# Patient Record
Sex: FEMALE | Race: WHITE | NOT HISPANIC OR LATINO | Employment: UNEMPLOYED | URBAN - METROPOLITAN AREA
[De-identification: names, ages, dates, MRNs, and addresses within clinical notes are randomized per-mention and may not be internally consistent; named-entity substitution may affect disease eponyms.]

---

## 2019-12-09 ENCOUNTER — HISTORICAL (OUTPATIENT)
Dept: ADMINISTRATIVE | Facility: HOSPITAL | Age: 39
End: 2019-12-09

## 2019-12-09 LAB
ALBUMIN SERPL-MCNC: 3.9 GM/DL (ref 3.4–5)
ALBUMIN/GLOB SERPL: 1.2 RATIO (ref 1.1–2)
ALP SERPL-CCNC: 112 UNIT/L (ref 45–117)
ALT SERPL-CCNC: 46 UNIT/L (ref 12–78)
AST SERPL-CCNC: 29 UNIT/L (ref 15–37)
BILIRUB SERPL-MCNC: 0.5 MG/DL (ref 0.2–1)
BILIRUBIN DIRECT+TOT PNL SERPL-MCNC: 0.1 MG/DL (ref 0–0.2)
BILIRUBIN DIRECT+TOT PNL SERPL-MCNC: 0.4 MG/DL
BUN SERPL-MCNC: 12 MG/DL (ref 7–18)
CALCIUM SERPL-MCNC: 9.1 MG/DL (ref 8.5–10.1)
CHLORIDE SERPL-SCNC: 109 MMOL/L (ref 98–107)
CO2 SERPL-SCNC: 29 MMOL/L (ref 21–32)
CREAT SERPL-MCNC: 0.8 MG/DL (ref 0.6–1.3)
GLOBULIN SER-MCNC: 3.2 GM/ML (ref 2.3–3.5)
GLUCOSE SERPL-MCNC: 90 MG/DL (ref 74–106)
POTASSIUM SERPL-SCNC: 3.2 MMOL/L (ref 3.5–5.1)
PROT SERPL-MCNC: 7.1 GM/DL (ref 6.4–8.2)
SODIUM SERPL-SCNC: 143 MMOL/L (ref 136–145)

## 2019-12-10 ENCOUNTER — HISTORICAL (OUTPATIENT)
Dept: SURGERY | Facility: HOSPITAL | Age: 39
End: 2019-12-10

## 2020-06-22 LAB — POC BETA-HCG (QUAL): NEGATIVE

## 2020-07-08 ENCOUNTER — HISTORICAL (OUTPATIENT)
Dept: ADMINISTRATIVE | Facility: HOSPITAL | Age: 40
End: 2020-07-08

## 2020-07-08 LAB
ABS NEUT (OLG): 6.81 X10(3)/MCL (ref 2.1–9.2)
BASOPHILS # BLD AUTO: 0 X10(3)/MCL (ref 0–0.2)
BASOPHILS NFR BLD AUTO: 0 %
EOSINOPHIL # BLD AUTO: 0.1 X10(3)/MCL (ref 0–0.9)
EOSINOPHIL NFR BLD AUTO: 1 %
ERYTHROCYTE [DISTWIDTH] IN BLOOD BY AUTOMATED COUNT: 12.4 % (ref 11.5–14.5)
EST. AVERAGE GLUCOSE BLD GHB EST-MCNC: 103 MG/DL
HBA1C MFR BLD: 5.2 % (ref 4.2–6.3)
HCT VFR BLD AUTO: 43.7 % (ref 35–46)
HGB BLD-MCNC: 14.6 GM/DL (ref 12–16)
IMM GRANULOCYTES # BLD AUTO: 0.02 10*3/UL
IMM GRANULOCYTES NFR BLD AUTO: 0 %
LYMPHOCYTES # BLD AUTO: 3.3 X10(3)/MCL (ref 0.6–4.6)
LYMPHOCYTES NFR BLD AUTO: 31 %
MCH RBC QN AUTO: 30.7 PG (ref 26–34)
MCHC RBC AUTO-ENTMCNC: 33.4 GM/DL (ref 31–37)
MCV RBC AUTO: 92 FL (ref 80–100)
MONOCYTES # BLD AUTO: 0.6 X10(3)/MCL (ref 0.1–1.3)
MONOCYTES NFR BLD AUTO: 6 %
NEUTROPHILS # BLD AUTO: 6.81 X10(3)/MCL (ref 2.1–9.2)
NEUTROPHILS NFR BLD AUTO: 62 %
PLATELET # BLD AUTO: 302 X10(3)/MCL (ref 130–400)
PMV BLD AUTO: 8.9 FL (ref 7.4–10.4)
RBC # BLD AUTO: 4.75 X10(6)/MCL (ref 4–5.2)
WBC # SPEC AUTO: 10.9 X10(3)/MCL (ref 4.5–11)

## 2020-07-13 ENCOUNTER — HISTORICAL (OUTPATIENT)
Dept: ADMINISTRATIVE | Facility: HOSPITAL | Age: 40
End: 2020-07-13

## 2020-07-16 ENCOUNTER — HISTORICAL (OUTPATIENT)
Dept: SURGERY | Facility: HOSPITAL | Age: 40
End: 2020-07-16

## 2020-08-28 LAB
BILIRUB SERPL-MCNC: NEGATIVE MG/DL
BLOOD URINE, POC: NORMAL
CLARITY, POC UA: NORMAL
COLOR, POC UA: NORMAL
GLUCOSE UR QL STRIP: NEGATIVE
KETONES UR QL STRIP: NORMAL
LEUKOCYTE EST, POC UA: NORMAL
NITRITE, POC UA: POSITIVE
PH, POC UA: 6
PROTEIN, POC: NEGATIVE
SPECIFIC GRAVITY, POC UA: 1.02
UROBILINOGEN, POC UA: NORMAL

## 2021-09-17 ENCOUNTER — HISTORICAL (OUTPATIENT)
Dept: RADIOLOGY | Facility: HOSPITAL | Age: 41
End: 2021-09-17

## 2022-04-11 ENCOUNTER — HISTORICAL (OUTPATIENT)
Dept: ADMINISTRATIVE | Facility: HOSPITAL | Age: 42
End: 2022-04-11

## 2022-04-29 VITALS
WEIGHT: 156.5 LBS | DIASTOLIC BLOOD PRESSURE: 55 MMHG | BODY MASS INDEX: 27.73 KG/M2 | HEIGHT: 63 IN | OXYGEN SATURATION: 99 % | SYSTOLIC BLOOD PRESSURE: 112 MMHG

## 2022-04-30 NOTE — PROGRESS NOTES
Patient:   Sonya Johnson             MRN: [id number]            FIN: [id number]               Age:   40 years     Sex:  Female     :  1980   Associated Diagnoses:   None   Author:   Arianna Coley MD      Pre-Op Dx:  40 F with cervical dysplasia        Plan:  [facility]    Reviewed resident's H&P.  Agree with plan.  Proceed with case

## 2022-05-05 NOTE — HISTORICAL [facility] CERNER
This is a historical note converted from Shaheed. Formatting and pictures may have been removed.  Please reference Shaheed for original formatting and attached multimedia. History of Present Illness  38yo  with Bartholins cyst presents to preop clinic for Bartholins cyst marsupialization. She notes it has come and gone for about 10 years and that she previously had an I&D but never a marsupialization. Cyst is currently uncomfortable but not painful. Notes a small amount of drainage but still painful.  ?   TODAY: patient is doing well, she has no complaints this morning.  ?   OBHx: 1 SAB  GYNHx: denies h/o STDs or abnormal paps. Sexually active with single male partner, her .  ???? Last pap smear 2017: NEM, HPV 16+  ???? Nexplanon in place for contraception. Plans on removal in 2019 to attempt pregnancy, currently working on weight loss  SocHx: Denies alcohol, tobacco, illicit drugs. Does not work. Her brother in law and mother will drive her to and from surgery.  Review of Systems  Constitutional: denies weight changes; denies fever/chills; denies nausea/vomiting  Respiratory: denies difficulty breathing  Cardiovascular: denies chest pain  Gastrointestinal: denies?abdominal pain,?denies changes in bowel habits  Genitourinary: denies vaginal bleeding; denies vaginal discharge  Neurologic: denies HA, syncope, weakness  All Other ROS: negative?except HPI  Physical Exam  Vitals & Measurements  T:?36? ?C (Temporal Artery)? TMIN:?36? ?C (Temporal Artery)? TMAX:?36.9? ?C (Oral)? HR:?75(Monitored)? RR:?20? BP:?125/75? SpO2:?100%? WT:?90.9?kg? BMI:?34.4?  General Appearance: Alert, cooperative, no distress  Abdomen: obese, soft, non-distended, non-tender  Pelvic: deferred  Extremities: Extremities normal, atraumatic, symmetrical swelling in bilateral lower extremities, no erythema or calf tenderness  Skin: Skin turgor normal, no rashes or lesions.  Assessment/Plan  Ancef ordered  SCDs in place  To OR for  scheduled Bartholins cyst marsupialization  ?  Carolee Hassan MD  [employer] [facility] PGY-2   Problem List/Past Medical History  Ongoing  Bartholins cyst  Bipolar  Cerebral palsy  HLD (hyperlipidemia)  HTN (hypertension)  Obesity  Historical  No qualifying data  Procedure/Surgical History  Drainage of Vulva, Open Approach (04/22/2017)  Incision and drainage of vulva or perineal abscess (04/22/2017)  R ankle   Medications  Inpatient  cefazolin 2gm/50ml D5W (Premix), 2 gm, IV Piggyback, On Call  IVF Lactated Ringers LR Infusion 1,000 mL, 1000 mL, IV  Home  Benadryl 25 mg oral capsule, 25 mg= 1 cap(s), Oral, Once a day (at bedtime), PRN  fluocinonide 0.05% topical solution, 1 gabi, TOP, TID  fluticasone 50 mcg/inh nasal spray, 1 spray(s), Both Nostrils, BID  hydrocortisone topical 2.5% cream, 1 gabi, TOP, TID  hydrOXYzine hydrochloride 10 mg oral tablet, 10 mg= 1 tab(s), Oral, BID, PRN  ketoconazole 2% topical cream, 1 gabi, TOP, Daily  Latuda 80 mg oral tablet, 80 mg= 1 tab(s), Oral, Daily  Lipitor 10 mg oral tablet, 10 mg= 1 tab(s), Oral, Daily  lisinopril 10 mg oral tablet, 10 mg= 1 tab(s), Oral, Daily  Nexplanon 68 mg subcutaneous implant  sertraline 50 mg oral tablet, 50 mg= 1 tab(s), Oral, Daily  Allergies  penicillins?(hives)  Social History  Abuse/Neglect  No, No, Yes, 12/10/2019  Alcohol  Current, Beer, Wine, 1-2 times per week, 04/22/2017  Employment/School  Unemployed, 11/29/2017  Exercise  Exercise duration: 20. Exercise frequency: 5-6 times/week., 11/29/2017  Home/Environment  Lives with Spouse., 11/29/2017  Nutrition/Health  Regular, 11/29/2017  Substance Use  Never, 04/25/2017  Tobacco  Former smoker, quit more than 30 days ago, No, 12/10/2019  Family History  Hyperlipidemia.: Father.  Hypertension.: Father.  Primary malignant neoplasm of lung: Father.      I have seen the patient today preoperatively and she is able to state procedure in her own words.

## 2022-05-05 NOTE — HISTORICAL [facility] CERNER
This is a historical note converted from Cerner. Formatting and pictures may have been removed.  Please reference Cerner for original formatting and attached multimedia. Indication for Surgery  Bartholins gland cyst  Preoperative Diagnosis  Bartholins gland cyst  Postoperative Diagnosis  Bartholins gland cyst  Operation  Bartholins gland marsupialization  Surgeon(s)  Dr. Hassan HO-2  Assistant  Dr. Epps HO-4  Estimated Blood Loss  10cc  ?  IVF  1L  Urine Output  30cc  Findings  A right sided 4x4cm Bartholins gland cyst is present. Not erythematous, indurated or draining. Otherwise normal appearing external female genitalia. Uterus is?6-8wk size and?mobile on bimanual exam.?Upon drainage of the Bartholins gland cyst thick light pink drainage noted.  Specimen(s)  Bartholins gland cyst wall biopsy  Complications  None  Technique  The patient was then brought to the operating room with IVF running and SCDs in place. General anesthesia was obtained and found to be adequate. The patient was then prepped and draped in the normal sterile fashion in the dorsal supine position with Swapnil type stirrups.?  ?   1-2cc of Lidocaine 1% with epinephrine was injected into the vaginal mucosa over the Bartholins cyst. A 3.5cm vertical incision was made using a 10-blade just lateral to the hymenal ring and medial to [place] line down?to the the cyst wall. The vaginal mucosa was undermined using Metzenbaum scissors to further separate the cyst wall from the vaginal mucosa. An equivalent?incision was then made into the cyst wall using a scalpel. The cyst was drained using suction. A stat was then used to disrupt any remaining loculations inside of the cyst. Vertical mattress sutures were then placed circumferentially reapproximating the cyst wall and vaginal mucosa together in order to keep the cyst open to drain and heal. Hemostasis was achieved with the Bovie. 8cc ofLidocaine 1% with epinephrine was then injected into the borders of the  incision.  ?   The patient tolerated the procedure well. The patient was then extubated and awakened from anesthesia and brought to the recovery room in stable condition.  ?  ?Pamela was present for the entire procedure.  ?  Carolee Hassan MD  [employer] PGY-2   This certifies I was present during the entire period between opening and closing of the surgical field.

## 2022-05-05 NOTE — HISTORICAL [facility] CERNER
This is a historical note converted from Cerner. Formatting and pictures may have been removed.  Please reference Cerner for original formatting and attached multimedia. Indication for Surgery  Cervical Dysplasia  Preoperative Diagnosis  Cervical Dysplasia  Postoperative Diagnosis  s/p Cold Knife Cone  Operation  Cold Knife Cone  Surgeon(s)  Mary Ann Waller MD HOII  ?  Staff  Arianna Coley MD  Assistant  Cris Hernandez MD  Anesthesia  General  Estimated Blood Loss  5mL  Urine Output  175mL  Findings  Exam under anesthesia: gross normal appearing external female genitalia without lesions or masses.?Cervix with high grade?dysplasia noted?throughout transformation zone?extending 2mm?of the ectocervix at the 6 oclock position,?confirmed with decreased Lugols uptake.?Vaginal mucosa pink and well estrogenized no lesions or masses. Uterus was mobile, ~8 week size. Moderate descent and 3-4cm vaginal capacity. No adnexal masses palpated.  ?  Specimen(s)  Cervical Conization biopsy  Endocervical curettings  Complications  None  Technique  The patient was taken to the operating room where general anesthesia was found to be adequate. The patient was placed in the dorsal lithotomy position, prepared, and draped in the standard sterile fashion.  A weighted speculum was placed into the vagina, and the anterior lip of the cervix was grasped with a single tooth tenaculum.?Lugols was applied to the cervix, and the findings were as noted above. The cervix was then injected circumferentially with vasopressin. Two stay sutures were placed at the 3 oclock and 9 oclock position to aid with traction and ligation of the uterine arteries. Using a straight 11 blade, an incision was made on the lower lip of the cervix and carried circumferentially to excise a conical specimen. 3 sutures were placed at 12, 3 and 6 oclock to aid with traction of specimen. Following excision, an orienting suture was placed on the site of the specimen that  corresponds to the 12 oclock position in situ. Following removal of the cone specimen, bleeding was controlled with electrosurgical coagulation. Endocervical curettage was then performed using a Kevorkian curette. Electrosurgical coagulation was used until excellent hemostasis was noted. Surgicel was placed inside the cervical bed and Monsels was applied. The two stay sutures were loosely tied together. The instruments were removed from the vagina. Instrument and sponge counts were correct times two. The patient tolerated the procedure well and was taken to the PACU, awake, and in stable condition. ? was present and scrubbed for the entire procedure  ?   Mary Ann Waller MD  [facility]  ?   I was present with the resident during all critical and key portions of the procedure and agree with the findings documented in the residents note.

## 2022-09-22 ENCOUNTER — HISTORICAL (OUTPATIENT)
Dept: ADMINISTRATIVE | Facility: HOSPITAL | Age: 42
End: 2022-09-22
Payer: MEDICAID

## 2022-10-17 DIAGNOSIS — N63.20 LEFT BREAST MASS: Primary | ICD-10-CM

## 2022-12-01 ENCOUNTER — OFFICE VISIT (OUTPATIENT)
Dept: SURGERY | Facility: CLINIC | Age: 42
End: 2022-12-01
Payer: MEDICAID

## 2022-12-01 VITALS
HEART RATE: 80 BPM | OXYGEN SATURATION: 95 % | BODY MASS INDEX: 33.66 KG/M2 | SYSTOLIC BLOOD PRESSURE: 129 MMHG | TEMPERATURE: 99 F | DIASTOLIC BLOOD PRESSURE: 84 MMHG | HEIGHT: 63 IN | WEIGHT: 190 LBS | RESPIRATION RATE: 20 BRPM

## 2022-12-01 DIAGNOSIS — N64.89 COMPLEX SCLEROSING LESION OF LEFT BREAST: Primary | ICD-10-CM

## 2022-12-01 DIAGNOSIS — N63.20 LEFT BREAST MASS: ICD-10-CM

## 2022-12-01 PROCEDURE — 99215 OFFICE O/P EST HI 40 MIN: CPT | Mod: PBBFAC

## 2022-12-01 RX ORDER — LISINOPRIL 10 MG/1
10 TABLET ORAL
COMMUNITY
Start: 2022-10-10

## 2022-12-01 RX ORDER — ATORVASTATIN CALCIUM 10 MG/1
10 TABLET, FILM COATED ORAL
COMMUNITY
Start: 2022-11-21

## 2022-12-01 RX ORDER — OXCARBAZEPINE 600 MG/1
600 TABLET, FILM COATED ORAL 2 TIMES DAILY
COMMUNITY
Start: 2022-11-16

## 2022-12-01 RX ORDER — SODIUM CHLORIDE 9 MG/ML
INJECTION, SOLUTION INTRAVENOUS CONTINUOUS
Status: CANCELLED | OUTPATIENT
Start: 2022-12-01

## 2022-12-01 RX ORDER — RISPERIDONE 3 MG/1
3 TABLET ORAL NIGHTLY
COMMUNITY
Start: 2022-11-28

## 2022-12-01 RX ORDER — HEPARIN SODIUM 5000 [USP'U]/ML
5000 INJECTION, SOLUTION INTRAVENOUS; SUBCUTANEOUS ONCE
Status: CANCELLED | OUTPATIENT
Start: 2022-12-01 | End: 2022-12-01

## 2022-12-01 RX ORDER — OLANZAPINE 20 MG/1
20 TABLET ORAL NIGHTLY
COMMUNITY
Start: 2022-11-28

## 2022-12-01 RX ORDER — MEDROXYPROGESTERONE ACETATE 150 MG/ML
150 INJECTION, SUSPENSION INTRAMUSCULAR
COMMUNITY

## 2022-12-01 NOTE — H&P (VIEW-ONLY)
[facility] General Surgery Clinic Note    HPI: Sonya Johnson is a 42 y.o. with PMH of HTN, HLD here for evaluation of a L breast mass. The mass was noticed on patient's first mammogram in July, subsequently biopsied at outside facility in Oct showing radial scar tissue. Since then, the mass has not increased in size. Painful only when pressure is applied. Denies breast dimpling, skin changes, nipple discharge. No personal history of cancer, other breast lesion. Family hx of breast cancer in maternal grandmother. Surgical history of Current smoking ~1ppd, occasional alcohol and marijuana use.    PMH:   Past Medical History:   Diagnosis Date    Bipolar disorder     Cerebral palsy, unspecified     Hypertension     Schizophrenia       Meds:   Current Outpatient Medications:     atorvastatin (LIPITOR) 10 MG tablet, Take 10 mg by mouth., Disp: , Rfl:     lisinopriL 10 MG tablet, Take 10 mg by mouth., Disp: , Rfl:     medroxyPROGESTERone (DEPO-PROVERA) 150 mg/mL injection, Inject 150 mg into the muscle every 3 (three) months., Disp: , Rfl:     OLANZapine (ZYPREXA) 20 MG tablet, Take 20 mg by mouth every evening., Disp: , Rfl:     OXcarbazepine (TRILEPTAL) 600 MG Tab, Take 600 mg by mouth 2 (two) times daily., Disp: , Rfl:     risperiDONE (RISPERDAL) 3 MG Tab, Take 3 mg by mouth every evening., Disp: , Rfl:   Allergies:   Review of patient's allergies indicates:   Allergen Reactions    Penicillins Hives     Social History:   Social History     Tobacco Use    Smoking status: Every Day     Packs/day: 0.50     Types: Cigarettes    Smokeless tobacco: Never     Family History:   Family History   Problem Relation Age of Onset    Lung cancer Father      Surgical History:   Past Surgical History:   Procedure Laterality Date    COLPOSCOPY      right ankle surgery Right      Review of Systems negative except for above    Objective:    Vitals:  Vitals:    12/01/22 1426   BP: 129/84   Pulse: 80   Resp: 20   Temp: 98.8 °F (37.1 °C)  "    Physical Exam:  Gen: NAD  Neuro: awake, alert, answering questions appropriately  Resp: non-labored breathing, DELFINA  Ext: moves all 4 spontaneously and purposefully  Skin: warm, well perfused  Breast: Tender, firm L breast mass superior to nipple. No other palpable masses on b/l breast, b/l axilla, b/l chest wall. No skin changes, dimpling, nipple changes on either breast.    Pathology (outside facility, found in "Media" tab):  10/13/22  BREAST,LEFT,CORE BIOPSY  - Complex sclerosing lesion with microcalcifications, measuring at least 0.8cm, supported by ADH5 immunostain (appropriate controls)    Assessment/Plan: Sonya Johnson is a 42 y.o. with PMH of HTN, HLD here for evaluation of a L breast mass    - Plan for mag seed placement and excisional biopsy    Calin Mello MS3  12/01/2022 2:52 PM     Patient seen and evaluated with the student. I agree with the documentation above.    CSL of left breast; plan for magseed placement, then excisional bx 12/19    Romulo Weller MD  PGY-1 [employer] [facility]      "

## 2022-12-01 NOTE — PROGRESS NOTES
\[facility]\"" General Surgery Clinic Note    HPI: Sonya Johnson is a 42 y.o. with PMH of HTN, HLD here for evaluation of a L breast mass. The mass was noticed on patient's first mammogram in July, subsequently biopsied at outside facility in Oct showing radial scar tissue. Since then, the mass has not increased in size. Painful only when pressure is applied. Denies breast dimpling, skin changes, nipple discharge. No personal history of cancer, other breast lesion. Family hx of breast cancer in maternal grandmother. Surgical history of Current smoking ~1ppd, occasional alcohol and marijuana use.    PMH:   Past Medical History:   Diagnosis Date    Bipolar disorder     Cerebral palsy, unspecified     Hypertension     Schizophrenia       Meds:   Current Outpatient Medications:     atorvastatin (LIPITOR) 10 MG tablet, Take 10 mg by mouth., Disp: , Rfl:     lisinopriL 10 MG tablet, Take 10 mg by mouth., Disp: , Rfl:     medroxyPROGESTERone (DEPO-PROVERA) 150 mg/mL injection, Inject 150 mg into the muscle every 3 (three) months., Disp: , Rfl:     OLANZapine (ZYPREXA) 20 MG tablet, Take 20 mg by mouth every evening., Disp: , Rfl:     OXcarbazepine (TRILEPTAL) 600 MG Tab, Take 600 mg by mouth 2 (two) times daily., Disp: , Rfl:     risperiDONE (RISPERDAL) 3 MG Tab, Take 3 mg by mouth every evening., Disp: , Rfl:   Allergies:   Review of patient's allergies indicates:   Allergen Reactions    Penicillins Hives     Social History:   Social History     Tobacco Use    Smoking status: Every Day     Packs/day: 0.50     Types: Cigarettes    Smokeless tobacco: Never     Family History:   Family History   Problem Relation Age of Onset    Lung cancer Father      Surgical History:   Past Surgical History:   Procedure Laterality Date    COLPOSCOPY      right ankle surgery Right      Review of Systems negative except for above    Objective:    Vitals:  Vitals:    12/01/22 1426   BP: 129/84   Pulse: 80   Resp: 20   Temp: 98.8 °F (37.1 °C)  "    Physical Exam:  Gen: NAD  Neuro: awake, alert, answering questions appropriately  Resp: non-labored breathing, DELFINA  Ext: moves all 4 spontaneously and purposefully  Skin: warm, well perfused  Breast: Tender, firm L breast mass superior to nipple. No other palpable masses on b/l breast, b/l axilla, b/l chest wall. No skin changes, dimpling, nipple changes on either breast.    Pathology (outside facility, found in "Media" tab):  10/13/22  BREAST,LEFT,CORE BIOPSY  - Complex sclerosing lesion with microcalcifications, measuring at least 0.8cm, supported by ADH5 immunostain (appropriate controls)    Assessment/Plan: Sonya Johnson is a 42 y.o. with PMH of HTN, HLD here for evaluation of a L breast mass    - Plan for mag seed placement and excisional biopsy    Calin Mello MS3  12/01/2022 2:52 PM     Patient seen and evaluated with the student. I agree with the documentation above.    CSL of left breast; plan for magseed placement, then excisional bx 12/19    Romulo Weller MD  PGY-1 [employer] [facility]      "

## 2022-12-01 NOTE — PROGRESS NOTES
Patient seen and examined by Dr. CALE Weller. Scheduled for surgery on 12/19/22. Message sent to VINCENT Hensley and CALE Moss for magseed scheduling. Patient informed of preop instructions and followup post surgery given. Written and verbal discharge instructions given.

## 2022-12-02 NOTE — PROGRESS NOTES
I have reviewed the notes, assessments, and/or procedures performed by the resident, I concur with her/his documentation of Sonya Johnson.     Ashley Bennett MD

## 2022-12-15 ENCOUNTER — HOSPITAL ENCOUNTER (OUTPATIENT)
Dept: RADIOLOGY | Facility: HOSPITAL | Age: 42
Discharge: HOME | End: 2022-12-15
Attending: STUDENT IN AN ORGANIZED HEALTH CARE EDUCATION/TRAINING PROGRAM
Payer: MEDICAID

## 2022-12-15 ENCOUNTER — ANESTHESIA EVENT (OUTPATIENT)
Dept: SURGERY | Facility: HOSPITAL | Age: 42
End: 2022-12-15
Payer: MEDICAID

## 2022-12-15 DIAGNOSIS — N63.20 LEFT BREAST MASS: ICD-10-CM

## 2022-12-15 PROCEDURE — 77061 MAMMO DIGITAL DIAGNOSTIC LEFT WITH TOMO: ICD-10-PCS | Mod: 26,LT,, | Performed by: RADIOLOGY

## 2022-12-15 PROCEDURE — 19285 PERQ DEV BREAST 1ST US IMAG: CPT | Mod: LT,,, | Performed by: RADIOLOGY

## 2022-12-15 PROCEDURE — 77061 BREAST TOMOSYNTHESIS UNI: CPT | Mod: 26,LT,, | Performed by: RADIOLOGY

## 2022-12-15 PROCEDURE — 77065 DX MAMMO INCL CAD UNI: CPT | Mod: TC,LT

## 2022-12-15 PROCEDURE — 77065 MAMMO DIGITAL DIAGNOSTIC LEFT WITH TOMO: ICD-10-PCS | Mod: 26,LT,, | Performed by: RADIOLOGY

## 2022-12-15 PROCEDURE — 77065 DX MAMMO INCL CAD UNI: CPT | Mod: 26,LT,, | Performed by: RADIOLOGY

## 2022-12-15 PROCEDURE — 19285 PERQ DEV BREAST 1ST US IMAG: CPT | Mod: LT

## 2022-12-15 PROCEDURE — 19285 US LEFT MAGSEED LOCALIZATION: ICD-10-PCS | Mod: LT,,, | Performed by: RADIOLOGY

## 2022-12-15 PROCEDURE — A4648 IMPLANTABLE TISSUE MARKER: HCPCS

## 2022-12-15 NOTE — ANESTHESIA PREPROCEDURE EVALUATION
"                                                                                                             12/15/2022  Sonya Johnson is a 42 y.o., female.    COVID STATUS: VACCINE X 2 (PFIZER, LASTLY 2/3/21)  BETA-BLOCKER: NONE    PAT NURSE PHONE INTERVIEW 12/16/22    PROBLEM LIST:  -  LEFT BREAST MASS      - S/P 10/13/22 BREAST,LEFT,CORE BIOPSY: Complex sclerosing lesion with microcalcifications, measuring at least 0.8cm, supported by ADH5 immunostain (appropriate controls)  -  UPT STATUS- NEG DOS   -  OBESITY CLASS I  -  HTN  -  HLD  -  CEREBRAL PALSY w/MILD RIGHT SIDED WEAKNESS  -  BIPOLAR DISORDER, SCHIZOPHRENIA      - Hx 10/6/17 PEC for DEPRESSION, SI      - ER 6/26/21 w/DELUSIONS, SILVIA, HAVING SMOKED SPICE      - ER 8/3/21 w/PSYCHOSIS, AH, DELUSIONS (OPC) --> TRANSFERRED to [facility]. FACILITY  -  SMOKER 11 PPY  -  ETOH "OCASSIONALLY"  -  MARIJUANA USE       Lab Results   Component Value Date    WBC 15.0 (H) 08/03/2021    HGB 14.1 08/03/2021    HCT 41.9 08/03/2021     08/03/2021    ALT 41 08/03/2021    AST 36 (H) 08/03/2021     08/03/2021    K 3.3 (L) 08/03/2021    CREATININE 0.66 08/03/2021    BUN 5.7 (L) 08/03/2021    CO2 25 08/03/2021    TSH 0.959 10/06/2017    HGBA1C 5.2 07/08/2020     Vitals:    12/19/22 0832   BP: 130/82   Pulse:    Temp:        AM Rx DOS: TRILEPTAL     ORDERS -   SURGEON: 10/6/17 EKG; 8/3/21 CBC, CMP;  ANESTHESIA: UPT      Pre-op Assessment    I have reviewed the Patient Summary Reports.     I have reviewed the Nursing Notes. I have reviewed the NPO Status.   I have reviewed the Medications.     Review of Systems  Anesthesia Hx:  No problems with previous Anesthesia  History of prior surgery of interest to airway management or planning: Denies Family Hx of Anesthesia complications.   Denies Personal Hx of Anesthesia complications.   Hematology/Oncology:  Hematology Normal   Oncology Normal     EENT/Dental:EENT/Dental Normal   Cardiovascular:  Cardiovascular Normal   "   Pulmonary:  Pulmonary Normal    Renal/:  Renal/ Normal     Hepatic/GI:  Hepatic/GI Normal    Musculoskeletal:  Musculoskeletal Normal    Neurological:  Neurology Normal    Endocrine:  Endocrine Normal    Dermatological:  Skin Normal    Psych:  Psychiatric Normal              Anesthesia Plan  Type of Anesthesia, risks & benefits discussed:    Anesthesia Type: Gen Supraglottic Airway  Intra-op Monitoring Plan: Standard ASA Monitors  Post Op Pain Control Plan: IV/PO Opioids PRN  Induction:  IV  Airway Plan: Direct  Informed Consent: Informed consent signed with the Patient representative and all parties understand the risks and agree with anesthesia plan.  All questions answered. Patient consented to blood products? No  ASA Score: 3  Day of Surgery Review of History & Physical: H&P Update referred to the surgeon/provider.    Ready For Surgery From Anesthesia Perspective.     .    Lab Results   Component Value Date    WBC 15.0 (H) 08/03/2021    HGB 14.1 08/03/2021    HCT 41.9 08/03/2021    MCV 94.6 (H) 08/03/2021     08/03/2021     CMP  Sodium Level   Date Value Ref Range Status   08/03/2021 141 136 - 145 mmol/L Final     Potassium Level   Date Value Ref Range Status   08/03/2021 3.3 (L) 3.5 - 5.1 mmol/L Final     Carbon Dioxide   Date Value Ref Range Status   08/03/2021 25 22 - 29 mmol/L Final     Blood Urea Nitrogen   Date Value Ref Range Status   08/03/2021 5.7 (L) 7.0 - 18.7 mg/dL Final     Creatinine   Date Value Ref Range Status   08/03/2021 0.66 0.55 - 1.02 mg/dL Final     Calcium Level Total   Date Value Ref Range Status   08/03/2021 9.3 8.4 - 10.2 mg/dL Final     Albumin Level   Date Value Ref Range Status   08/03/2021 3.8 3.5 - 5.0 gm/dL Final     Bilirubin Total   Date Value Ref Range Status   08/03/2021 0.7 <<=1.5 mg/dL Final     Alkaline Phosphatase   Date Value Ref Range Status   08/03/2021 100 40 - 150 unit/L Final     Aspartate Aminotransferase   Date Value Ref Range Status   08/03/2021 36  (H) 5 - 34 unit/L Final     Alanine Aminotransferase   Date Value Ref Range Status   08/03/2021 41 0 - 55 unit/L Final

## 2022-12-19 ENCOUNTER — HOSPITAL ENCOUNTER (OUTPATIENT)
Facility: HOSPITAL | Age: 42
Discharge: HOME | End: 2022-12-19
Attending: SURGERY | Admitting: SURGERY
Payer: MEDICAID

## 2022-12-19 ENCOUNTER — ANESTHESIA (OUTPATIENT)
Dept: SURGERY | Facility: HOSPITAL | Age: 42
End: 2022-12-19
Payer: MEDICAID

## 2022-12-19 DIAGNOSIS — N63.20 LEFT BREAST MASS: ICD-10-CM

## 2022-12-19 LAB
B-HCG UR QL: NEGATIVE
CTP QC/QA: YES

## 2022-12-19 PROCEDURE — 37000009 HC ANESTHESIA EA ADD 15 MINS: Performed by: SURGERY

## 2022-12-19 PROCEDURE — 63600175 PHARM REV CODE 636 W HCPCS

## 2022-12-19 PROCEDURE — 71000016 HC POSTOP RECOV ADDL HR: Performed by: SURGERY

## 2022-12-19 PROCEDURE — 25000003 PHARM REV CODE 250: Performed by: SPECIALIST

## 2022-12-19 PROCEDURE — 36000706: Performed by: SURGERY

## 2022-12-19 PROCEDURE — 71000033 HC RECOVERY, INTIAL HOUR: Performed by: SURGERY

## 2022-12-19 PROCEDURE — 88307 TISSUE EXAM BY PATHOLOGIST: CPT | Performed by: SURGERY

## 2022-12-19 PROCEDURE — 63600175 PHARM REV CODE 636 W HCPCS: Performed by: NURSE ANESTHETIST, CERTIFIED REGISTERED

## 2022-12-19 PROCEDURE — 36000707: Performed by: SURGERY

## 2022-12-19 PROCEDURE — 25000003 PHARM REV CODE 250: Performed by: NURSE ANESTHETIST, CERTIFIED REGISTERED

## 2022-12-19 PROCEDURE — 37000008 HC ANESTHESIA 1ST 15 MINUTES: Performed by: SURGERY

## 2022-12-19 PROCEDURE — 63600175 PHARM REV CODE 636 W HCPCS: Performed by: SPECIALIST

## 2022-12-19 PROCEDURE — 25000003 PHARM REV CODE 250

## 2022-12-19 PROCEDURE — 25000003 PHARM REV CODE 250: Performed by: SURGERY

## 2022-12-19 PROCEDURE — 71000015 HC POSTOP RECOV 1ST HR: Performed by: SURGERY

## 2022-12-19 PROCEDURE — 81025 URINE PREGNANCY TEST: CPT | Performed by: NURSE PRACTITIONER

## 2022-12-19 RX ORDER — OXYCODONE AND ACETAMINOPHEN 5; 325 MG/1; MG/1
2 TABLET ORAL ONCE
Status: COMPLETED | OUTPATIENT
Start: 2022-12-19 | End: 2022-12-19

## 2022-12-19 RX ORDER — PROCHLORPERAZINE EDISYLATE 5 MG/ML
5 INJECTION INTRAMUSCULAR; INTRAVENOUS ONCE AS NEEDED
Status: ACTIVE | OUTPATIENT
Start: 2022-12-19 | End: 2034-05-17

## 2022-12-19 RX ORDER — SODIUM CHLORIDE, SODIUM LACTATE, POTASSIUM CHLORIDE, CALCIUM CHLORIDE 600; 310; 30; 20 MG/100ML; MG/100ML; MG/100ML; MG/100ML
INJECTION, SOLUTION INTRAVENOUS CONTINUOUS
Status: ACTIVE | OUTPATIENT
Start: 2022-12-19

## 2022-12-19 RX ORDER — ONDANSETRON 2 MG/ML
INJECTION INTRAMUSCULAR; INTRAVENOUS
Status: DISCONTINUED | OUTPATIENT
Start: 2022-12-19 | End: 2022-12-19

## 2022-12-19 RX ORDER — HYDROMORPHONE HYDROCHLORIDE 1 MG/ML
0.2 INJECTION, SOLUTION INTRAMUSCULAR; INTRAVENOUS; SUBCUTANEOUS EVERY 5 MIN PRN
Status: ACTIVE | OUTPATIENT
Start: 2022-12-19

## 2022-12-19 RX ORDER — HYDROMORPHONE HYDROCHLORIDE 1 MG/ML
0.5 INJECTION, SOLUTION INTRAMUSCULAR; INTRAVENOUS; SUBCUTANEOUS EVERY 5 MIN PRN
Status: ACTIVE | OUTPATIENT
Start: 2022-12-19

## 2022-12-19 RX ORDER — HYDROCODONE BITARTRATE AND ACETAMINOPHEN 5; 325 MG/1; MG/1
1 TABLET ORAL EVERY 6 HOURS PRN
Qty: 10 TABLET | Refills: 0 | Status: SHIPPED | OUTPATIENT
Start: 2022-12-19 | End: 2023-03-08

## 2022-12-19 RX ORDER — BUPIVACAINE HYDROCHLORIDE 2.5 MG/ML
INJECTION, SOLUTION EPIDURAL; INFILTRATION; INTRACAUDAL
Status: DISCONTINUED | OUTPATIENT
Start: 2022-12-19 | End: 2022-12-19 | Stop reason: HOSPADM

## 2022-12-19 RX ORDER — LIDOCAINE HYDROCHLORIDE 20 MG/ML
INJECTION, SOLUTION EPIDURAL; INFILTRATION; INTRACAUDAL; PERINEURAL
Status: DISCONTINUED | OUTPATIENT
Start: 2022-12-19 | End: 2022-12-19

## 2022-12-19 RX ORDER — MIDAZOLAM HYDROCHLORIDE 1 MG/ML
2 INJECTION INTRAMUSCULAR; INTRAVENOUS ONCE
Status: COMPLETED | OUTPATIENT
Start: 2022-12-19 | End: 2022-12-19

## 2022-12-19 RX ORDER — IPRATROPIUM BROMIDE AND ALBUTEROL SULFATE 2.5; .5 MG/3ML; MG/3ML
3 SOLUTION RESPIRATORY (INHALATION) ONCE AS NEEDED
Status: ACTIVE | OUTPATIENT
Start: 2022-12-19 | End: 2034-05-17

## 2022-12-19 RX ORDER — MIDAZOLAM HYDROCHLORIDE 1 MG/ML
INJECTION INTRAMUSCULAR; INTRAVENOUS
Status: DISCONTINUED
Start: 2022-12-19 | End: 2022-12-19 | Stop reason: HOSPADM

## 2022-12-19 RX ORDER — CLINDAMYCIN PHOSPHATE 900 MG/50ML
INJECTION, SOLUTION INTRAVENOUS
Status: DISCONTINUED
Start: 2022-12-19 | End: 2022-12-19 | Stop reason: HOSPADM

## 2022-12-19 RX ORDER — PROPOFOL 10 MG/ML
VIAL (ML) INTRAVENOUS
Status: DISCONTINUED | OUTPATIENT
Start: 2022-12-19 | End: 2022-12-19

## 2022-12-19 RX ORDER — MEPERIDINE HYDROCHLORIDE 25 MG/ML
12.5 INJECTION INTRAMUSCULAR; INTRAVENOUS; SUBCUTANEOUS ONCE
Status: ACTIVE | OUTPATIENT
Start: 2022-12-19 | End: 2022-12-20

## 2022-12-19 RX ORDER — HEPARIN SODIUM 5000 [USP'U]/ML
5000 INJECTION, SOLUTION INTRAVENOUS; SUBCUTANEOUS ONCE
Status: COMPLETED | OUTPATIENT
Start: 2022-12-19 | End: 2022-12-19

## 2022-12-19 RX ORDER — DEXAMETHASONE SODIUM PHOSPHATE 4 MG/ML
INJECTION, SOLUTION INTRA-ARTICULAR; INTRALESIONAL; INTRAMUSCULAR; INTRAVENOUS; SOFT TISSUE
Status: DISCONTINUED | OUTPATIENT
Start: 2022-12-19 | End: 2022-12-19

## 2022-12-19 RX ORDER — CLINDAMYCIN PHOSPHATE 900 MG/50ML
900 INJECTION, SOLUTION INTRAVENOUS
Status: COMPLETED | OUTPATIENT
Start: 2022-12-19 | End: 2022-12-19

## 2022-12-19 RX ORDER — DIPHENHYDRAMINE HYDROCHLORIDE 50 MG/ML
25 INJECTION INTRAMUSCULAR; INTRAVENOUS ONCE AS NEEDED
Status: ACTIVE | OUTPATIENT
Start: 2022-12-19 | End: 2034-05-17

## 2022-12-19 RX ORDER — FENTANYL CITRATE 50 UG/ML
INJECTION, SOLUTION INTRAMUSCULAR; INTRAVENOUS
Status: DISCONTINUED | OUTPATIENT
Start: 2022-12-19 | End: 2022-12-19

## 2022-12-19 RX ORDER — LIDOCAINE HYDROCHLORIDE 10 MG/ML
1 INJECTION, SOLUTION EPIDURAL; INFILTRATION; INTRACAUDAL; PERINEURAL ONCE
Status: ACTIVE | OUTPATIENT
Start: 2022-12-19

## 2022-12-19 RX ORDER — KETOROLAC TROMETHAMINE 30 MG/ML
INJECTION, SOLUTION INTRAMUSCULAR; INTRAVENOUS
Status: DISCONTINUED | OUTPATIENT
Start: 2022-12-19 | End: 2022-12-19

## 2022-12-19 RX ORDER — SODIUM CHLORIDE 9 MG/ML
INJECTION, SOLUTION INTRAVENOUS CONTINUOUS
Status: DISCONTINUED | OUTPATIENT
Start: 2022-12-19 | End: 2022-12-19 | Stop reason: HOSPADM

## 2022-12-19 RX ORDER — ONDANSETRON 2 MG/ML
4 INJECTION INTRAMUSCULAR; INTRAVENOUS ONCE
Status: ACTIVE | OUTPATIENT
Start: 2022-12-19

## 2022-12-19 RX ADMIN — HEPARIN SODIUM 5000 UNITS: 5000 INJECTION, SOLUTION INTRAVENOUS; SUBCUTANEOUS at 08:12

## 2022-12-19 RX ADMIN — OXYCODONE HYDROCHLORIDE AND ACETAMINOPHEN 2 TABLET: 5; 325 TABLET ORAL at 02:12

## 2022-12-19 RX ADMIN — FENTANYL CITRATE 25 MCG: 50 INJECTION, SOLUTION INTRAMUSCULAR; INTRAVENOUS at 11:12

## 2022-12-19 RX ADMIN — DEXAMETHASONE SODIUM PHOSPHATE 8 MG: 4 INJECTION, SOLUTION INTRA-ARTICULAR; INTRALESIONAL; INTRAMUSCULAR; INTRAVENOUS; SOFT TISSUE at 11:12

## 2022-12-19 RX ADMIN — SODIUM CHLORIDE, POTASSIUM CHLORIDE, SODIUM LACTATE AND CALCIUM CHLORIDE: 600; 310; 30; 20 INJECTION, SOLUTION INTRAVENOUS at 10:12

## 2022-12-19 RX ADMIN — CLINDAMYCIN IN 5 PERCENT DEXTROSE 900 MG: 18 INJECTION, SOLUTION INTRAVENOUS at 11:12

## 2022-12-19 RX ADMIN — LIDOCAINE HYDROCHLORIDE 90 MG: 20 INJECTION, SOLUTION EPIDURAL; INFILTRATION; INTRACAUDAL; PERINEURAL at 11:12

## 2022-12-19 RX ADMIN — SODIUM CHLORIDE, POTASSIUM CHLORIDE, SODIUM LACTATE AND CALCIUM CHLORIDE: 600; 310; 30; 20 INJECTION, SOLUTION INTRAVENOUS at 11:12

## 2022-12-19 RX ADMIN — KETOROLAC TROMETHAMINE 30 MG: 30 INJECTION, SOLUTION INTRAMUSCULAR; INTRAVENOUS at 11:12

## 2022-12-19 RX ADMIN — PROPOFOL 150 MG: 10 INJECTION, EMULSION INTRAVENOUS at 11:12

## 2022-12-19 RX ADMIN — MIDAZOLAM HYDROCHLORIDE 2 MG: 1 INJECTION, SOLUTION INTRAMUSCULAR; INTRAVENOUS at 10:12

## 2022-12-19 RX ADMIN — ONDANSETRON 4 MG: 2 INJECTION INTRAMUSCULAR; INTRAVENOUS at 12:12

## 2022-12-19 RX ADMIN — FENTANYL CITRATE 25 MCG: 50 INJECTION, SOLUTION INTRAMUSCULAR; INTRAVENOUS at 12:12

## 2022-12-19 NOTE — DISCHARGE INSTRUCTIONS
· Keep follow up appointment  [facility]. You will receive your pathology results from the surgeon.    · Alternate pain medicine (NORCO) as prescribed and Ibuprofen for pain. You may take Tylenol and Ibuprofen when you dont need or after you finish your pain prescription. Do not take Tylenol (acetaminophen) with your NORCO, since NORCO contains Tylenol as well.    · No heavy lifting over 10 pounds or straining or lifting arms overhead for 4 weeks.    · You may use ice pack as needed for 20 minutes at a time 6-8 times per day.    ··   · You may remove  ace wrap tomorrow to shower.  Wash area gently with soap and water, pat dry and replace the ace wrap for compression  · Do not drink alcohol or drive today, or as long as you are on pain medication.    · Notify MD of any moderate to severe pain unrelieved by pain medicine or for any signs of infection including fever above 100.4, excessive redness or swelling, yellow/green foul- smelling drainage, nausea or vomiting. Clinics number is [phone]. If it is after business hours or emergency call [phone] and state Im having post op complications and need to speak to the surgeon on call.    ·  Thanks for choosing [facility]! Have a smooth recovery!

## 2022-12-19 NOTE — OP NOTE
[facility]  Operative Note    SUMMARY     Surgery Date: 12/19/2022     Surgeon(s) and Role:     * Ashley Bennett MD - Primary     * Raul Fallon MD - Resident - Chief    Assisting Surgeon: None    Pre-op Diagnosis: Left breast mass    Post-op Diagnosis:  Post-Op Diagnosis Codes:     * Left breast mass [N63.20]    Procedure(s) (LRB):  EXCISION,MASS,BREAST,USING RADIOLOGICAL MARKER (Left)    Anesthesia: Choice    Operative Findings: Circumscribed breast mass    The patient was intubated without complications. Patient was preped and draped in the standard fashion. Timeout was completed. Magseed was used to localize the breast mass tagged with a clip. An incision was made a 15 blade around the left areola from the 6 o'clock position to the 9 o'clock position. Further dissection of breast tissue was performed with bovie electrocautery utilizing Magseed for proper orientation and localization of the mass. Once directly above the mass, circumferential dissection was performed surrounding the mass with margins of approximately 1.5 cm until the chest wall was encountered with Bovie electrocautery, the tissue was then removed and labels were attached for orientation. Fascia and dermal tissue was then closed with 3-0 vicryl. Skin was closed with 4-0 monocryl. Dressings including dermabond, 4x4 and ACE compression wrap was placed around the chest. Patient tolerated the procedure well without complications. Patient was extubated and taken to the postoperative care area.     Dr. Bennett was present for the entirety of the procedure    Estimated Blood Loss:   Estimated Blood Loss has not been documented. EBL = 5 cc.         Specimens:   Specimen (24h ago, onward)       Start     Ordered    12/19/22 1232  Specimen to Pathology  RELEASE UPON ORDERING        References:    Click here for ordering Quick Tip   Question:  Release to patient  Answer:  Immediate    12/19/22 2782                     [id number]    José Antonio Camilo MD  12/19/2022 1:28 PM

## 2022-12-19 NOTE — DISCHARGE SUMMARY
[facility]  Discharge Note  Short Stay    Procedure(s) (LRB):  EXCISION,MASS,BREAST,USING RADIOLOGICAL MARKER (Left)      OUTCOME: Patient tolerated treatment/procedure well without complication and is now ready for discharge.    DISPOSITION: Home or Self Care    FINAL DIAGNOSIS:  Breast mass    FOLLOWUP: In clinic    DISCHARGE INSTRUCTIONS:  No discharge procedures on file.     TIME SPENT ON DISCHARGE: 10 minutes

## 2022-12-19 NOTE — ANESTHESIA PROCEDURE NOTES
Intubation    Date/Time: 12/19/2022 11:19 AM  Performed by: Leonel Pineda CRNA  Authorized by: Ermelinda Huitron MD     Intubation:     Induction:  Intravenous    Intubated:  Postinduction    Mask Ventilation:  Not attempted    Attempts:  1    Attempted By:  CRNA    Difficult Airway Encountered?: No      Complications:  None    Airway Device:  Supraglottic airway/LMA    Airway Device Size:  4.0    Style/Cuff Inflation:  Other (see comments) (igel)    Secured at:  The lips    Placement Verified By:  Capnometry    Complicating Factors:  None    Findings Post-Intubation:  BS equal bilateral and atraumatic/condition of teeth unchanged

## 2022-12-19 NOTE — TRANSFER OF CARE
Anesthesia Transfer of Care Note    Patient: Sonya Johnson    Procedure(s) Performed: Procedure(s) (LRB):  EXCISION,MASS,BREAST,USING RADIOLOGICAL MARKER (Left)    Patient location: PACU    Anesthesia Type: general    Transport from OR: Transported from OR on room air with adequate spontaneous ventilation    Post pain: adequate analgesia    Post assessment: no apparent anesthetic complications    Post vital signs: stable    Level of consciousness: sedated    Nausea/Vomiting: no nausea/vomiting    Complications: none    Transfer of care protocol was followed      Last vitals:   Visit Vitals  /74   Pulse 88   Temp 36 °C (96.8 °F) (Temporal)   Resp 20   Wt 86.2 kg (190 lb 0.6 oz)   SpO2 100%   Breastfeeding No   BMI 33.67 kg/m²

## 2022-12-19 NOTE — INTERVAL H&P NOTE
The patient has been examined and the H&P has been reviewed:    I concur with the findings and no changes have occurred since H&P was written.    Surgery risks, benefits and alternative options discussed and understood by patient/family.    OR for magseed-guided excision of L breast. Laterality confirmed, marked; consents signed    There are no hospital problems to display for this patient.

## 2022-12-20 VITALS
SYSTOLIC BLOOD PRESSURE: 132 MMHG | DIASTOLIC BLOOD PRESSURE: 79 MMHG | BODY MASS INDEX: 33.67 KG/M2 | OXYGEN SATURATION: 97 % | TEMPERATURE: 98 F | HEART RATE: 67 BPM | RESPIRATION RATE: 20 BRPM | WEIGHT: 190.06 LBS

## 2022-12-20 NOTE — ANESTHESIA POSTPROCEDURE EVALUATION
Anesthesia Post Evaluation    Patient: Sonya Johnson    Procedure(s) Performed: Procedure(s) (LRB):  EXCISION,MASS,BREAST,USING RADIOLOGICAL MARKER (Left)    Final Anesthesia Type: general      Patient location during evaluation: PACU  Patient participation: Yes- Able to Participate  Level of consciousness: awake and responds to stimulation  Post-procedure vital signs: reviewed and stable  Pain management: adequate  Airway patency: patent    PONV status at discharge: No PONV  Anesthetic complications: no      Cardiovascular status: blood pressure returned to baseline  Respiratory status: unassisted  Hydration status: euvolemic  Follow-up not needed.          Vitals Value Taken Time   /79 12/19/22 1455   Temp 36.9 °C (98.4 °F) 12/19/22 1500   Pulse 67 12/19/22 1455   Resp 20 12/19/22 1455   SpO2 97 % 12/19/22 1455         Event Time   Out of Recovery 13:45:00         Pain/James Score: Pain Rating Prior to Med Admin: 6 (12/19/2022  2:07 PM)  Pain Rating Post Med Admin: 4 (12/19/2022  1:45 PM)  James Score: 10 (12/19/2022  1:55 PM)  Modified James Score: 19 (12/19/2022  2:55 PM)

## 2022-12-21 LAB
ESTROGEN SERPL-MCNC: NORMAL PG/ML
INSULIN SERPL-ACNC: NORMAL U[IU]/ML
LAB AP CLINICAL INFORMATION: NORMAL
LAB AP GROSS DESCRIPTION: NORMAL
LAB AP REPORT FOOTNOTES: NORMAL
T3RU NFR SERPL: NORMAL %

## 2023-01-05 ENCOUNTER — OFFICE VISIT (OUTPATIENT)
Dept: SURGERY | Facility: CLINIC | Age: 43
End: 2023-01-05
Payer: MEDICAID

## 2023-01-05 VITALS
BODY MASS INDEX: 33.66 KG/M2 | DIASTOLIC BLOOD PRESSURE: 85 MMHG | TEMPERATURE: 98 F | HEIGHT: 63 IN | HEART RATE: 84 BPM | SYSTOLIC BLOOD PRESSURE: 129 MMHG | OXYGEN SATURATION: 96 % | WEIGHT: 190 LBS | RESPIRATION RATE: 20 BRPM

## 2023-01-05 DIAGNOSIS — N64.89 RADIAL SCAR OF LEFT BREAST: Primary | ICD-10-CM

## 2023-01-05 PROCEDURE — 99214 OFFICE O/P EST MOD 30 MIN: CPT | Mod: PBBFAC

## 2023-01-05 NOTE — PROGRESS NOTES
CC: s/p L lumpectomy 12/19 for radial scar      S: healing well no complaints, no tenderness, moving well no numbness    O: AFVSS  Gen: NCAT  Chest: CTAB, left breast incision c/d/I, well-healing scar with some dermabond in place  CV: NSR  Abd: s/nt/nd, incisions well healed   Ext: warm and well-perfused bilaterally      Final Diagnosis      Left breast mass, excisional biopsy:   - Complex sclerosing lesion with atypical ductal hyperplasia, papillomas, usual ductal hyperplasia, sclerosing adenosis, cystic apocrine metaplasia, pregnancy-like change and cyst formation.  - No evidence of malignancy.       A/P:  Sonya Johnson is a 42 y.o. female p/t clinic s/p left breast lumpectomy 12/19 for radial scar.     - left breast mammogram in 6mo  - RTC 6mo after MMG  - plan for annual screening MMG after    Levar Hogue MD  [employer] General Surgery PGY3

## 2023-03-08 ENCOUNTER — OFFICE VISIT (OUTPATIENT)
Dept: GYNECOLOGY | Facility: CLINIC | Age: 43
End: 2023-03-08
Payer: MEDICAID

## 2023-03-08 VITALS
DIASTOLIC BLOOD PRESSURE: 80 MMHG | HEART RATE: 82 BPM | HEIGHT: 62 IN | BODY MASS INDEX: 35.51 KG/M2 | RESPIRATION RATE: 20 BRPM | TEMPERATURE: 99 F | SYSTOLIC BLOOD PRESSURE: 118 MMHG | WEIGHT: 193 LBS | OXYGEN SATURATION: 97 %

## 2023-03-08 DIAGNOSIS — Z00.00 HEALTHCARE MAINTENANCE: ICD-10-CM

## 2023-03-08 DIAGNOSIS — Z12.4 CERVICAL CANCER SCREENING: Primary | ICD-10-CM

## 2023-03-08 DIAGNOSIS — Z87.410 PERSONAL HISTORY OF CERVICAL DYSPLASIA: ICD-10-CM

## 2023-03-08 PROCEDURE — 87624 HPV HI-RISK TYP POOLED RSLT: CPT

## 2023-03-08 PROCEDURE — 99213 OFFICE O/P EST LOW 20 MIN: CPT | Mod: PBBFAC

## 2023-03-08 RX ORDER — FLUTICASONE PROPIONATE 50 MCG
1 SPRAY, SUSPENSION (ML) NASAL
COMMUNITY

## 2023-03-08 RX ORDER — MEDROXYPROGESTERONE ACETATE 150 MG/ML
INJECTION, SUSPENSION INTRAMUSCULAR
COMMUNITY

## 2023-03-08 NOTE — PROGRESS NOTES
[facility] GYNECOLOGY CLINIC NOTE     Sonya Johnson is a 42 y.o.  presenting to GYN clinic for a pap smear. She reports no gynecologic complaints. She has not had a menstrual period for 1 year as she has been on Depo Provera shot. She denies any side effects. She denies any abnormal discharge, bleeding, or pain.     She has a past gyn history of abnormal pap and CKC in . Co testing in  was negative. She has a history of Bartholin cyst in 2019 that has recurred x1, most recently last month, that resolved without treatment. She also recently had a L breast mass excisional biopsy that was not concerning for malignancy.     Gynecology  OB History          1    Para        Term                AB   1    Living             SAB   1    IAB        Ectopic        Multiple        Live Births                    Past Medical History:   Diagnosis Date    Bipolar disorder     Cerebral palsy, unspecified     Hypertension     Schizophrenia       Past Surgical History:   Procedure Laterality Date    BARTHOLIN CYST MARSUPILIZATION  12/10/2019    COLD KNIFE CONIZATION OF CERVIX  2020    COLPOSCOPY  2020    EXCISION, MASS, BREAST, USING RADIOLOGICAL MARKER Left 2022    Procedure: EXCISION,MASS,BREAST,USING RADIOLOGICAL MARKER;  Surgeon: Ashley Bennett MD;  Location: [facility];  Service: General;  Laterality: Left;    right ankle surgery Right       Current Outpatient Medications   Medication Instructions    atorvastatin (LIPITOR) 10 mg, Oral    fluticasone propionate (FLONASE) 50 mcg/actuation nasal spray 1 spray, Each Nostril    lisinopriL 10 mg, Oral    medroxyPROGESTERone (DEPO-PROVERA) 150 mg/mL injection No dose, route, or frequency recorded.    medroxyPROGESTERone (DEPO-PROVERA) 150 mg, Intramuscular, Every 3 months    OLANZapine (ZYPREXA) 20 mg, Oral, Nightly    OXcarbazepine (TRILEPTAL) 600 mg, Oral, 2 times daily    risperiDONE (RISPERDAL) 3 mg, Oral, Nightly     Social History  "    Tobacco Use    Smoking status: Every Day     Packs/day: 0.50     Years: 22.00     Pack years: 11.00     Types: Cigarettes    Smokeless tobacco: Never   Substance Use Topics    Alcohol use: Yes     Comment: occ    Drug use: Yes     Types: Marijuana     Comment: once a week       Review of Systems  Pertinent items are noted in HPI.     Objective:     /80 (BP Location: Left arm, Patient Position: Sitting, BP Method: Large (Automatic))   Pulse 82   Temp 98.6 °F (37 °C) (Oral)   Resp 20   Ht 5' 2" (1.575 m)   Wt 87.5 kg (193 lb)   LMP  (LMP Unknown)   SpO2 97%   BMI 35.30 kg/m²   Physical Exam:  Gen: Well-nourished, well-developed female appearing stated age. Alert, cooperative, in no acute distress.  HEENT: No thyromegaly, goiter, or masses  Breasts: General/bilateral: deferred   CV: rrr, no murmurs/rubs/gallops  Chest: ctabl, no wheezes/rales/rhonchi  Abdomen: Soft, non-tender, no masses.  Extrem: Extremities normal, atraumatic, non-tender calves.  External genitalia: Normal female genetalia without lesion, discharge or tenderness. Mild erythema in inguinal folds.   Speculum Exam: Vaginal vault without discharge, nonodorous, no lesions/masses seen.  Cervical os visualized as closed, no lesions/masses.   Bimanual Exam: No cervical motion tenderness.  Uterus freely mobile.  Normal size uterus. No adnexal masses.  Nontender exam.   Note: RN chaperone present for entirety of exam.    Relevant Labs:   none    Relevant Imaging:  none      Assessment:       42 y.o.  here for pap smear after CKC in .  No diagnosis found.       Plan:   Pap smear s/p CKC in .   -NIL and negative HPV in   -Repeat pap smear in 1 year. If negative, continue with regular screening frequency.     Problem List Items Addressed This Visit    None      Return to clinic 1 year for pap smear.     Discussed patient and plan with Dr. Coley.     "

## 2023-03-09 NOTE — PROGRESS NOTES
[facility] GYNECOLOGY CLINIC NOTE     Sonya Johnson is a 42 y.o.  presenting to GYN clinic for a pap smear. She reports no gynecologic complaints. She has not had a menstrual period for 1 year as she has been on Depo Provera shot. She denies any side effects. She denies any abnormal discharge, bleeding, or pain.     She has a past gyn history of high grade dysplasia and subsequent CKC in . Repeat pap smear with co-testing in  was negative. She also has a history of Bartholin cyst in 2019 that has recurred x1, most recently last month, that resolved without treatment. She also recently had a L breast mass excisional biopsy that was not concerning for malignancy.     Gynecology  G0    Past Medical History:   Diagnosis Date    Bipolar disorder     Cerebral palsy, unspecified     Hypertension     Schizophrenia       Past Surgical History:   Procedure Laterality Date    BARTHOLIN CYST MARSUPILIZATION  12/10/2019    COLD KNIFE CONIZATION OF CERVIX  2020    COLPOSCOPY  2020    EXCISION, MASS, BREAST, USING RADIOLOGICAL MARKER Left 2022    Procedure: EXCISION,MASS,BREAST,USING RADIOLOGICAL MARKER;  Surgeon: Ashley Bennett MD;  Location: [facility];  Service: General;  Laterality: Left;    right ankle surgery Right       Current Outpatient Medications   Medication Instructions    atorvastatin (LIPITOR) 10 mg, Oral    fluticasone propionate (FLONASE) 50 mcg/actuation nasal spray 1 spray, Each Nostril    lisinopriL 10 mg, Oral    medroxyPROGESTERone (DEPO-PROVERA) 150 mg/mL injection No dose, route, or frequency recorded.    medroxyPROGESTERone (DEPO-PROVERA) 150 mg, Intramuscular, Every 3 months    OLANZapine (ZYPREXA) 20 mg, Oral, Nightly    OXcarbazepine (TRILEPTAL) 600 mg, Oral, 2 times daily    risperiDONE (RISPERDAL) 3 mg, Oral, Nightly     Social History     Tobacco Use    Smoking status: Every Day     Packs/day: 0.50     Years: 22.00     Pack years: 11.00     Types: Cigarettes     "Smokeless tobacco: Never   Substance Use Topics    Alcohol use: Yes     Comment: occ    Drug use: Yes     Types: Marijuana     Comment: once a week       Review of Systems  Pertinent items are noted in HPI.     Objective:     /80 (BP Location: Left arm, Patient Position: Sitting, BP Method: Large (Automatic))   Pulse 82   Temp 98.6 °F (37 °C) (Oral)   Resp 20   Ht 5' 2" (1.575 m)   Wt 87.5 kg (193 lb)   LMP  (LMP Unknown)   SpO2 97%   BMI 35.30 kg/m²   Physical Exam:  Gen: Well-nourished, well-developed female appearing stated age. Alert, cooperative, in no acute distress.  CV: rrr, no murmurs/rubs/gallops  Chest: ctabl, no wheezes/rales/rhonchi  Abdomen: Soft, non-tender, no masses.  Extrem: Extremities normal, atraumatic, non-tender calves.  External genitalia: Normal female genetalia without lesion, discharge or tenderness. Mild erythema in inguinal folds.   Speculum Exam: Vaginal vault without discharge, nonodorous, no lesions/masses seen.  Cervical os visualized as closed, no lesions/masses.   Bimanual Exam: No cervical motion tenderness.  Uterus freely mobile.  Normal size uterus. No adnexal masses.  Nontender exam.   Note: RN chaperone present for entirety of exam.    Assessment:       42 y.o.  here for pap smear after CKC in 2020.  1. Cervical cancer screening  Liquid-Based Pap Smear, Screening Screening      2. Healthcare maintenance               Plan:     Problem List Items Addressed This Visit          Renal/    Cervical cancer screening - Primary    Relevant Orders    Liquid-Based Pap Smear, Screening Screening       Other    Healthcare maintenance     Given history of CKC 2/2 cervical dysplasia  Patient was to have 3 pap smears at 6, 12 and 24 months. 6 month pap smear was NILM, HPV- but no year gema pap smear post CKC was complted. This is the 24 month pap smear. Will plan to modify course and repeat pap smear in 1 year. If normal can transition out to every 3 years.    Return " to clinic 1 year for pap smear.     Discussed patient and plan with Dr. Coley.     Sugar Lau, MS3    Aurora Olmos MD PGY-4  Obstetrics and Gynecology

## 2023-03-10 LAB — PSYCHE PATHOLOGY RESULT: NORMAL

## 2023-03-13 ENCOUNTER — TELEPHONE (OUTPATIENT)
Dept: GYNECOLOGY | Facility: CLINIC | Age: 43
End: 2023-03-13
Payer: MEDICAID

## 2023-03-13 NOTE — TELEPHONE ENCOUNTER
----- Message from Aurora Olmos MD sent at 3/12/2023 11:48 AM CDT -----  Please inform this patient that her pap smear is normal  ----- Message -----  From: Background User Lab  Sent: 3/10/2023   3:24 PM CDT  To: Aurora Olmos MD    Notified patient, she does have an annual scheduled in a year

## 2023-06-12 PROBLEM — Z00.00 HEALTHCARE MAINTENANCE: Status: RESOLVED | Noted: 2023-03-08 | Resolved: 2023-06-12

## 2023-07-13 ENCOUNTER — HOSPITAL ENCOUNTER (OUTPATIENT)
Dept: RADIOLOGY | Facility: HOSPITAL | Age: 43
Discharge: HOME | End: 2023-07-13
Attending: STUDENT IN AN ORGANIZED HEALTH CARE EDUCATION/TRAINING PROGRAM
Payer: MEDICAID

## 2023-07-13 DIAGNOSIS — N64.89 RADIAL SCAR OF LEFT BREAST: ICD-10-CM

## 2023-07-13 PROCEDURE — 77061 BREAST TOMOSYNTHESIS UNI: CPT | Mod: 26,LT,, | Performed by: RADIOLOGY

## 2023-07-13 PROCEDURE — 77065 DX MAMMO INCL CAD UNI: CPT | Mod: 26,LT,, | Performed by: RADIOLOGY

## 2023-07-13 PROCEDURE — 77061 BREAST TOMOSYNTHESIS UNI: CPT | Mod: TC,LT

## 2023-07-13 PROCEDURE — 77065 MAMMO DIGITAL DIAGNOSTIC LEFT WITH TOMO: ICD-10-PCS | Mod: 26,LT,, | Performed by: RADIOLOGY

## 2023-07-13 PROCEDURE — 77061 MAMMO DIGITAL DIAGNOSTIC LEFT WITH TOMO: ICD-10-PCS | Mod: 26,LT,, | Performed by: RADIOLOGY

## 2023-11-01 ENCOUNTER — HOSPITAL ENCOUNTER (OUTPATIENT)
Dept: RADIOLOGY | Facility: HOSPITAL | Age: 43
Discharge: HOME | End: 2023-11-01
Attending: STUDENT IN AN ORGANIZED HEALTH CARE EDUCATION/TRAINING PROGRAM
Payer: MEDICAID

## 2023-11-01 ENCOUNTER — DOCUMENTATION ONLY (OUTPATIENT)
Dept: RADIOLOGY | Facility: HOSPITAL | Age: 43
End: 2023-11-01
Payer: MEDICAID

## 2023-11-01 ENCOUNTER — TELEPHONE (OUTPATIENT)
Dept: RADIOLOGY | Facility: HOSPITAL | Age: 43
End: 2023-11-01
Payer: MEDICAID

## 2023-11-01 DIAGNOSIS — Z12.31 ENCOUNTER FOR SCREENING MAMMOGRAM FOR MALIGNANT NEOPLASM OF BREAST: ICD-10-CM

## 2023-11-01 LAB
CREAT SERPL-MCNC: 0.64 MG/DL (ref 0.55–1.02)
GFR SERPLBLD CREATININE-BSD FMLA CKD-EPI: >60 MLS/MIN/1.73/M2

## 2023-11-01 PROCEDURE — 77067 SCR MAMMO BI INCL CAD: CPT | Mod: 26,,, | Performed by: RADIOLOGY

## 2023-11-01 PROCEDURE — 77061 MAMMO DIGITAL DIAGNOSTIC LEFT WITH TOMO: ICD-10-PCS | Mod: 26,LT,, | Performed by: RADIOLOGY

## 2023-11-01 PROCEDURE — 77067 SCR MAMMO BI INCL CAD: CPT | Mod: 59,TC

## 2023-11-01 PROCEDURE — 82565 ASSAY OF CREATININE: CPT | Performed by: STUDENT IN AN ORGANIZED HEALTH CARE EDUCATION/TRAINING PROGRAM

## 2023-11-01 PROCEDURE — 77061 BREAST TOMOSYNTHESIS UNI: CPT | Mod: 26,LT,, | Performed by: RADIOLOGY

## 2023-11-01 PROCEDURE — 77065 DX MAMMO INCL CAD UNI: CPT | Mod: 26,LT,, | Performed by: RADIOLOGY

## 2023-11-01 PROCEDURE — 77065 DX MAMMO INCL CAD UNI: CPT | Mod: TC,LT

## 2023-11-01 PROCEDURE — 77065 MAMMO DIGITAL DIAGNOSTIC LEFT WITH TOMO: ICD-10-PCS | Mod: 26,LT,, | Performed by: RADIOLOGY

## 2023-11-01 PROCEDURE — 76642 US BREAST LEFT LIMITED: ICD-10-PCS | Mod: 26,LT,, | Performed by: RADIOLOGY

## 2023-11-01 PROCEDURE — 76642 ULTRASOUND BREAST LIMITED: CPT | Mod: 26,LT,, | Performed by: RADIOLOGY

## 2023-11-01 PROCEDURE — 76642 ULTRASOUND BREAST LIMITED: CPT | Mod: TC,LT

## 2023-11-01 PROCEDURE — 25500020 PHARM REV CODE 255

## 2023-11-01 PROCEDURE — 77067 MAMMO DIGITAL SCREENING WITH CONTRAST, BILATERAL: ICD-10-PCS | Mod: 26,,, | Performed by: RADIOLOGY

## 2023-11-01 RX ADMIN — IOHEXOL 50 ML: 350 INJECTION, SOLUTION INTRAVENOUS at 11:11

## 2023-11-01 RX ADMIN — IOHEXOL 100 ML: 350 INJECTION, SOLUTION INTRAVENOUS at 11:11

## 2023-11-01 NOTE — NURSING
Patient was incorrectly scheduled for breast biopsy on date radiologist out of office. Contacted patient to reschedule. Patient scheduled for 12/28 at 1400. Multiple sooner appointment dates/times were offered to patient. Patient declines. Encouraged patient to call with questions/concerns.

## 2023-12-28 ENCOUNTER — HOSPITAL ENCOUNTER (OUTPATIENT)
Dept: RADIOLOGY | Facility: HOSPITAL | Age: 43
Discharge: HOME | End: 2023-12-28
Attending: STUDENT IN AN ORGANIZED HEALTH CARE EDUCATION/TRAINING PROGRAM
Payer: MEDICAID

## 2023-12-28 DIAGNOSIS — Z12.31 ENCOUNTER FOR SCREENING MAMMOGRAM FOR MALIGNANT NEOPLASM OF BREAST: ICD-10-CM

## 2023-12-28 DIAGNOSIS — N63.23 MASS OF LOWER OUTER QUADRANT OF LEFT BREAST: Primary | ICD-10-CM

## 2023-12-28 DIAGNOSIS — R92.8 ABNORMAL FINDINGS ON DIAGNOSTIC IMAGING OF BREAST: ICD-10-CM

## 2023-12-28 PROCEDURE — 19083 BX BREAST 1ST LESION US IMAG: CPT | Mod: LT,,, | Performed by: RADIOLOGY

## 2023-12-28 PROCEDURE — 19083 US BREAST BIOPSY WITH IMAGING 1ST SITE LEFT: ICD-10-PCS | Mod: LT,,, | Performed by: RADIOLOGY

## 2023-12-28 PROCEDURE — 77065 MAMMO DIGITAL DIAGNOSTIC LEFT WITH TOMO: ICD-10-PCS | Mod: 26,LT,, | Performed by: RADIOLOGY

## 2023-12-28 PROCEDURE — 77065 DX MAMMO INCL CAD UNI: CPT | Mod: TC,LT

## 2023-12-28 PROCEDURE — 88341 IMHCHEM/IMCYTCHM EA ADD ANTB: CPT | Mod: TC | Performed by: RADIOLOGY

## 2023-12-28 PROCEDURE — A4648 IMPLANTABLE TISSUE MARKER: HCPCS

## 2023-12-28 PROCEDURE — 77061 MAMMO DIGITAL DIAGNOSTIC LEFT WITH TOMO: ICD-10-PCS | Mod: 26,LT,, | Performed by: RADIOLOGY

## 2023-12-28 PROCEDURE — 77065 DX MAMMO INCL CAD UNI: CPT | Mod: 26,LT,, | Performed by: RADIOLOGY

## 2023-12-28 PROCEDURE — 77061 BREAST TOMOSYNTHESIS UNI: CPT | Mod: 26,LT,, | Performed by: RADIOLOGY

## 2023-12-28 RX ORDER — LIDOCAINE HCL/EPINEPHRINE/PF 2%-1:200K
VIAL (ML) INJECTION
Status: DISCONTINUED
Start: 2023-12-28 | End: 2023-12-29 | Stop reason: HOSPADM

## 2023-12-28 RX ORDER — LIDOCAINE HYDROCHLORIDE 20 MG/ML
INJECTION, SOLUTION EPIDURAL; INFILTRATION; INTRACAUDAL; PERINEURAL
Status: DISCONTINUED
Start: 2023-12-28 | End: 2023-12-29 | Stop reason: HOSPADM

## 2024-01-04 LAB
DHEA SERPL-MCNC: NORMAL
ESTROGEN SERPL-MCNC: NORMAL PG/ML
INSULIN SERPL-ACNC: NORMAL U[IU]/ML
LAB AP CLINICAL INFORMATION: NORMAL
LAB AP GROSS DESCRIPTION: NORMAL
LAB AP REPORT FOOTNOTES: NORMAL
T3RU NFR SERPL: NORMAL %

## 2024-01-05 ENCOUNTER — TELEPHONE (OUTPATIENT)
Dept: RADIOLOGY | Facility: HOSPITAL | Age: 44
End: 2024-01-05
Payer: MEDICAID

## 2024-01-05 NOTE — NURSING
Breast biopsy pathology results and recommendations discussed with patient via telephone. She verbalized understanding and had no questions at this time. Encouraged her to call with questions/concerns.

## 2024-01-05 NOTE — TELEPHONE ENCOUNTER
----- Message from Denis Ramírez MD sent at 1/4/2024  7:52 PM CST -----  Regarding: Benign Pathology  Please see below.     Roxana, please communicate the results of the biopsy and recommendations to the patient.    Thanks.            Pathology is now available for review, and demonstrates:  Ultrasound-guided core biopsy of the left lower outer breast 2.3 x 1.2 x 3 cm heterogeneous mass/fibroglandular tissue at the 4 o'clock location, 7 cm from the nipple.  Hydromark open coil-shaped biopsy clip is in appropriate position.  - Sclerosing adenosis tumor (nodular adenosis)    Please see pathology report for full details. These pathology results are concordant with imaging findings.     In this high-risk patient (greater than 20% lifetime breast cancer risk), recommend:  1. Referral to a high-risk clinic (if not already performed).    2. Annual contrast-enhanced breast MRI surveillance (not currently available at [facility]) in addition to annual mammography (with Digital Breast Tomosynthesis), preferably alternating mammography and MRI every 6 months.    OR    continued annual contrast-enhanced mammography with Digital Breast Tomosynthesis next due in 11/2024 should breast MRI (to be performed at an outside facility) not be an option for the patient. Please contact the [facility] radiology department for ordering information.    Pathology results and recommendations will be communicated by Roxana Estrada [facility] Breast Imaging nurse navigator, to the patient/provider and documented in the electronic medical record.

## 2024-03-13 ENCOUNTER — OFFICE VISIT (OUTPATIENT)
Dept: GYNECOLOGY | Facility: CLINIC | Age: 44
End: 2024-03-13
Payer: MEDICAID

## 2024-03-13 VITALS
HEIGHT: 64 IN | HEART RATE: 81 BPM | WEIGHT: 195.19 LBS | OXYGEN SATURATION: 96 % | TEMPERATURE: 99 F | DIASTOLIC BLOOD PRESSURE: 84 MMHG | SYSTOLIC BLOOD PRESSURE: 127 MMHG | BODY MASS INDEX: 33.32 KG/M2

## 2024-03-13 DIAGNOSIS — Z12.4 CERVICAL CANCER SCREENING: ICD-10-CM

## 2024-03-13 DIAGNOSIS — Z87.410 PERSONAL HISTORY OF CERVICAL DYSPLASIA: Primary | ICD-10-CM

## 2024-03-13 PROCEDURE — 88174 CYTOPATH C/V AUTO IN FLUID: CPT

## 2024-03-13 PROCEDURE — 99213 OFFICE O/P EST LOW 20 MIN: CPT | Mod: PBBFAC

## 2024-03-13 PROCEDURE — 99459 PELVIC EXAMINATION: CPT | Mod: PBBFAC | Performed by: STUDENT IN AN ORGANIZED HEALTH CARE EDUCATION/TRAINING PROGRAM

## 2024-03-13 PROCEDURE — 87624 HPV HI-RISK TYP POOLED RSLT: CPT

## 2024-03-13 RX ORDER — CHLORHEXIDINE GLUCONATE 4 %
LIQUID (ML) TOPICAL
COMMUNITY

## 2024-03-13 NOTE — PROGRESS NOTES
[facility] GYNECOLOGY CLINIC NOTE     Sonya Johnson is a 43 y.o.  presenting to GYN clinic for repeat pap after CKC. Had CKC in  with high grade dysplasia. Per last visit given missed appt for appropriate post excision monitoring, would need one additional 12 mos from prior before spacing to q3 year pap smears. Pt denies complaints today.     Pap hx:   2017: NILM HPV16+  2020: ASCUS HPV16+  2020 COLPO: ECC can't rule out HSIL  2020 CKC: Severe dysplasia w/ negative margins  2021 pap: NILM hrHPV neg  3/2023 pap: NILM hrHPV neg    Gynecology  OB History          1    Para        Term                AB   1    Living             SAB   1    IAB        Ectopic        Multiple        Live Births                    Past Medical History:   Diagnosis Date    Bipolar disorder     Cerebral palsy, unspecified     Hypertension     Schizophrenia       Past Surgical History:   Procedure Laterality Date    BARTHOLIN CYST MARSUPILIZATION  12/10/2019    COLD KNIFE CONIZATION OF CERVIX  2020    COLPOSCOPY  2020    EXCISION, MASS, BREAST, USING RADIOLOGICAL MARKER Left 2022    Procedure: EXCISION,MASS,BREAST,USING RADIOLOGICAL MARKER;  Surgeon: Ashley Bennett MD;  Location: [facility];  Service: General;  Laterality: Left;    right ankle surgery Right       Current Outpatient Medications   Medication Instructions    atorvastatin (LIPITOR) 10 mg, Oral    fluticasone propionate (FLONASE) 50 mcg/actuation nasal spray 1 spray, Each Nostril    lisinopriL 10 mg, Oral    medroxyPROGESTERone (DEPO-PROVERA) 150 mg/mL injection No dose, route, or frequency recorded.    medroxyPROGESTERone (DEPO-PROVERA) 150 mg, Intramuscular, Every 3 months    melatonin 12 mg Tab 1 capsule at bedtime Orally Once a day  As needed otc for insomnia    OLANZapine (ZYPREXA) 20 mg, Oral, Nightly    OXcarbazepine (TRILEPTAL) 600 mg, Oral, 2 times daily    risperiDONE (RISPERDAL) 3 mg, Oral, Nightly     Social  "History     Tobacco Use    Smoking status: Every Day     Current packs/day: 0.50     Average packs/day: 0.5 packs/day for 22.0 years (11.0 ttl pk-yrs)     Types: Cigarettes    Smokeless tobacco: Never   Substance Use Topics    Alcohol use: Yes     Comment: occ    Drug use: Yes     Types: Marijuana     Comment: once a week       Review of Systems  Pertinent items are noted in HPI.     Objective:     /84 (BP Location: Left arm)   Pulse 81   Temp 98.8 °F (37.1 °C)   Ht 5' 4" (1.626 m)   Wt 88.5 kg (195 lb 3.2 oz)   SpO2 96%   BMI 33.51 kg/m²   Physical Exam:  Gen: Well-nourished, well-developed female appearing stated age. Alert, cooperative, in no acute distress.  CV: rr  Chest: no conversational dyspnea  Abdomen: Soft, non-tender, no masses.  Extrem: Extremities normal, atraumatic, non-tender calves.  External genitalia: Normal female genetalia without lesion, discharge or tenderness.   Speculum Exam: Vaginal vault without discharge, nonodorous, no lesions/masses seen.  Cervical os visualized as closed, evidence prior excision.     Note: Female RN chaperone present for entirety of exam.      Assessment:       43 y.o.  here for cervical cancer screening surveillance given hx high grade dysplasia.  1. Cervical cancer screening  Liquid-Based Pap Smear, Screening Screening      2. Personal history of cervical dysplasia  Liquid-Based Pap Smear, Screening Screening             Plan:         Problem List Items Addressed This Visit          Renal/    Cervical cancer screening    Relevant Orders    Liquid-Based Pap Smear, Screening Screening    Personal history of cervical dysplasia - Primary     Pap collected today. Discussed if normal can space to q3 yr testing, if HPV+ or dysplasia will go back to colposcopy.          Relevant Orders    Liquid-Based Pap Smear, Screening Screening       Return to clinic in one year for WWE; ok for pap in 3 yrs if this one is normal    Discussed patient and plan with " Brijesh    Salma Holt MD, MPH  [employer] [facility], PGY4

## 2024-03-14 PROBLEM — Z87.410 PERSONAL HISTORY OF CERVICAL DYSPLASIA: Status: ACTIVE | Noted: 2024-03-14

## 2024-03-14 NOTE — ASSESSMENT & PLAN NOTE
Pap collected today. Discussed if normal can space to q3 yr testing, if HPV+ or dysplasia will go back to colposcopy.

## 2024-03-18 LAB — PSYCHE PATHOLOGY RESULT: NORMAL

## 2024-03-26 ENCOUNTER — TELEPHONE (OUTPATIENT)
Dept: GYNECOLOGY | Facility: CLINIC | Age: 44
End: 2024-03-26
Payer: MEDICAID

## 2024-03-26 NOTE — TELEPHONE ENCOUNTER
Called pt to discuss unsatisfactory pap smear given no endocervical cells on specimen. Will schedule for repeat pap in next 4 weeks.     Salma Holt MD, MPH  [employer] [facility], PGY4

## 2024-12-10 ENCOUNTER — DOCUMENTATION ONLY (OUTPATIENT)
Dept: GYNECOLOGY | Facility: CLINIC | Age: 44
End: 2024-12-10
Payer: MEDICAID

## 2024-12-10 DIAGNOSIS — Z00.00 HEALTHCARE MAINTENANCE: Primary | ICD-10-CM

## 2024-12-10 NOTE — PROGRESS NOTES
Mammogram ordered  Referral placed to high risk breast clinic    11/2023 Mammo says:   In this high-risk patient (greater than 20% lifetime breast cancer risk), recommend:  1. Referral to a high-risk clinic (if not already performed).    2. Annual contrast-enhanced breast MRI surveillance (not currently available at [facility]) in addition to annual mammography (with Digital Breast Tomosynthesis), preferably alternating mammography and MRI every 6 months.    OR    continued annual contrast-enhanced mammography with Digital Breast Tomosynthesis next due in 11/2024 should breast MRI (to be performed at an outside facility) not be an option for the patient. Please contact the [facility] radiology department for ordering information.    Abril Douglass MD   PGY2, Obstetrics & Gynecology   [facility]   .

## 2024-12-12 ENCOUNTER — TELEPHONE (OUTPATIENT)
Dept: GYNECOLOGY | Facility: CLINIC | Age: 44
End: 2024-12-12
Payer: MEDICAID

## 2025-01-30 ENCOUNTER — HOSPITAL ENCOUNTER (OUTPATIENT)
Dept: RADIOLOGY | Facility: HOSPITAL | Age: 45
Discharge: HOME | End: 2025-01-30
Payer: MEDICAID

## 2025-01-30 DIAGNOSIS — Z00.00 HEALTHCARE MAINTENANCE: ICD-10-CM

## 2025-01-30 PROCEDURE — 77063 BREAST TOMOSYNTHESIS BI: CPT | Mod: 26,,, | Performed by: RADIOLOGY

## 2025-01-30 PROCEDURE — 77067 SCR MAMMO BI INCL CAD: CPT | Mod: TC

## 2025-01-30 PROCEDURE — 77067 SCR MAMMO BI INCL CAD: CPT | Mod: 26,,, | Performed by: RADIOLOGY

## 2025-03-03 ENCOUNTER — HOSPITAL ENCOUNTER (OUTPATIENT)
Dept: RADIOLOGY | Facility: HOSPITAL | Age: 45
Discharge: HOME | End: 2025-03-03
Payer: MEDICAID

## 2025-03-03 DIAGNOSIS — R92.8 ABNORMAL MAMMOGRAM: ICD-10-CM

## 2025-03-03 PROCEDURE — 77065 DX MAMMO INCL CAD UNI: CPT | Mod: 26,RT,, | Performed by: STUDENT IN AN ORGANIZED HEALTH CARE EDUCATION/TRAINING PROGRAM

## 2025-03-03 PROCEDURE — 76642 ULTRASOUND BREAST LIMITED: CPT | Mod: TC,RT

## 2025-03-03 PROCEDURE — 76642 ULTRASOUND BREAST LIMITED: CPT | Mod: 26,RT,, | Performed by: STUDENT IN AN ORGANIZED HEALTH CARE EDUCATION/TRAINING PROGRAM

## 2025-03-03 PROCEDURE — 77065 DX MAMMO INCL CAD UNI: CPT | Mod: TC,RT

## 2025-03-03 PROCEDURE — 77061 BREAST TOMOSYNTHESIS UNI: CPT | Mod: 26,RT,, | Performed by: STUDENT IN AN ORGANIZED HEALTH CARE EDUCATION/TRAINING PROGRAM

## 2025-03-21 DIAGNOSIS — Z91.89 AT HIGH RISK FOR BREAST CANCER: Primary | ICD-10-CM

## 2025-03-21 NOTE — PROGRESS NOTES
Referral to [facility] for elevated Tyrer-Cuzick score of 42%

## 2025-05-23 ENCOUNTER — TELEPHONE (OUTPATIENT)
Dept: SURGERY | Facility: CLINIC | Age: 45
End: 2025-05-23
Payer: MEDICAID

## 2025-05-23 NOTE — TELEPHONE ENCOUNTER
----- Message from Daja Soares sent at 5/22/2025 11:00 AM CDT -----  Patient had an appointment on 06/11/2025. She called to Cx I did ask patient if she would like to get it rescheduled she stated nope I just want to cancel

## (undated) DEVICE — GLOVE PROTEXIS BLUE LATEX 7.5

## (undated) DEVICE — NDL HYPO REG 25G X 1 1/2

## (undated) DEVICE — ELECTRODE BLADE INSULATED 1 IN

## (undated) DEVICE — SUT SILK 2.0 BLK 18

## (undated) DEVICE — COVER SITE-RITE PROBE 96IN

## (undated) DEVICE — GLOVE PROTEXIS BLUE LATEX 7

## (undated) DEVICE — SUT MCRYL PLUS 4-0 PS2 27IN

## (undated) DEVICE — SOL IRRI STRL WATER 1000ML

## (undated) DEVICE — NDL SYR 10ML 18X1.5 LL BLUNT

## (undated) DEVICE — APPLICATOR CHLORAPREP ORN 26ML

## (undated) DEVICE — MANIFOLD 4 PORT

## (undated) DEVICE — SUT 2/0 30IN SILK BLK BRAI

## (undated) DEVICE — ADHESIVE DERMABOND ADVANCED

## (undated) DEVICE — DEVICE SPECIMEN TRANSPEC

## (undated) DEVICE — GLOVE PROTEXIS LTX MICRO 6.5

## (undated) DEVICE — GLOVE PROTEXIS HYDROGEL SZ7

## (undated) DEVICE — ELECTRODE BLADE W/SLEEVE 2.75

## (undated) DEVICE — Device

## (undated) DEVICE — STAPLER SKIN ROTATING HEAD

## (undated) DEVICE — GLOVE PROTEXIS BLUE LATEX 6.5

## (undated) DEVICE — TIP SUCTION YANKAUER

## (undated) DEVICE — SYR 10CC LUER LOCK

## (undated) DEVICE — CLOSURE SKIN STERI STRIP 1/2X4

## (undated) DEVICE — SUT 3-0 MONOCRYL PLUS PS-2

## (undated) DEVICE — HANDLE DEVON RIGID OR LIGHT

## (undated) DEVICE — GLOVE PROTEXIS LTX MICRO  7.5

## (undated) DEVICE — GLOVE PROTEXIS LTX MICRO  7

## (undated) DEVICE — SUT SA85H SILK 2-0

## (undated) DEVICE — GOWN POLY REINF BRTH SLV XL